# Patient Record
Sex: FEMALE | Race: BLACK OR AFRICAN AMERICAN | NOT HISPANIC OR LATINO | URBAN - METROPOLITAN AREA
[De-identification: names, ages, dates, MRNs, and addresses within clinical notes are randomized per-mention and may not be internally consistent; named-entity substitution may affect disease eponyms.]

---

## 2020-08-08 ENCOUNTER — EMERGENCY (EMERGENCY)
Facility: HOSPITAL | Age: 50
LOS: 1 days | Discharge: HOME | End: 2020-08-08
Attending: EMERGENCY MEDICINE
Payer: MEDICARE

## 2020-08-08 VITALS
DIASTOLIC BLOOD PRESSURE: 88 MMHG | SYSTOLIC BLOOD PRESSURE: 122 MMHG | TEMPERATURE: 98 F | HEART RATE: 88 BPM | RESPIRATION RATE: 18 BRPM | OXYGEN SATURATION: 99 %

## 2020-08-08 VITALS
SYSTOLIC BLOOD PRESSURE: 127 MMHG | OXYGEN SATURATION: 100 % | HEART RATE: 82 BPM | WEIGHT: 227.08 LBS | RESPIRATION RATE: 18 BRPM | DIASTOLIC BLOOD PRESSURE: 83 MMHG | HEIGHT: 63 IN | TEMPERATURE: 98 F

## 2020-08-08 PROCEDURE — 73564 X-RAY EXAM KNEE 4 OR MORE: CPT

## 2020-08-08 PROCEDURE — 99283 EMERGENCY DEPT VISIT LOW MDM: CPT

## 2020-08-08 PROCEDURE — 93010 ELECTROCARDIOGRAM REPORT: CPT | Mod: GC

## 2020-08-08 PROCEDURE — 73564 X-RAY EXAM KNEE 4 OR MORE: CPT | Mod: 26,RT

## 2020-08-08 PROCEDURE — 99284 EMERGENCY DEPT VISIT MOD MDM: CPT | Mod: GC

## 2020-08-08 PROCEDURE — 93005 ELECTROCARDIOGRAM TRACING: CPT

## 2020-08-08 NOTE — ED PROVIDER NOTE - NS ED ROS FT
GENERAL: No fever or chills  EYES: No change in vision  HEENT: No trouble swallowing or speaking  CARDIAC: No chest pain  PULMONARY: No cough or SOB  GI: No abdominal pain, no nausea or no vomiting, no diarrhea or constipation  : No changes in urination  SKIN: No rashes  NEURO: No headache, no numbness  MSK: + R. knee joint pain  Otherwise as HPI or negative.

## 2020-08-08 NOTE — ED PROVIDER NOTE - CLINICAL SUMMARY MEDICAL DECISION MAKING FREE TEXT BOX
50 Y f no pertinent PMH presenting with R. Knee pain. Rule out DVT (non-tender calf, no stasis, cancer), rule out atraumatic fracture. Likely arthritis vs. osteoarthritis. Likely DC.    Lisa Sommer MD - Attending Physician: Pt here with R knee pain, atraumatic, mild swelling. No prior VTE history, no travel, no risk factors. Exam c/w likely arthritis vs Bakers cyst. Much less likely dvt. US and Xr for eval

## 2020-08-08 NOTE — ED PROVIDER NOTE - NSFOLLOWUPINSTRUCTIONS_ED_ALL_ED_FT
50 Y f no pertinent PMH presenting with R. Knee pain. Rule out DVT (non-tender calf, no stasis, cancer), rule out atraumatic fracture. Likely arthritis vs. osteoarthritis. Likely DC. You presented to the ER with knee pain. This is likely due to long-term wear of the knee joint; however, it is essential you complete an evaluation with outpatient ultrasonography to make sure there isn't a clot in your leg. Please followup within 2 days with your primary care doctor and ultrasonography of the R. Leg. At any point in time if you experience worsening swelling, redness over the knee, shortness of breath, chest pain, vomiting, or lose consciousness please return to the ER. You presented to the ER with knee pain. This is likely due to long-term wear of the knee joint; however, it is essential you complete an evaluation with outpatient ultrasonography to make sure there isn't a clot in your leg. Please followup within 24-48 hours with your primary care doctor for ultrasonography of the R. Leg. At any point in time if you experience worsening swelling, redness over the knee, shortness of breath, chest pain, vomiting, or lose consciousness please return to the ER.

## 2020-08-08 NOTE — ED PROVIDER NOTE - PROGRESS NOTE DETAILS
Sanjeev Walsh MD:  Ultrasound contacted, attempt to expedite care Sanjeev Walsh MD:  Shared decision making used with patient, states she wants to leave for outpatient ultrasound. Patient currently stable, vocalized discharge instructions, states will followup with PMD in 24-48 hours for outpatient ultrasound referral.

## 2020-08-08 NOTE — ED PROVIDER NOTE - PHYSICAL EXAMINATION
PHYSICAL EXAM:  GENERAL: NAD, well-developed  HEAD:  Atraumatic, Normocephalic  EYES: EOMI, PERRLA, conjunctiva and sclera clear  NECK: Supple, No JVD  CHEST/LUNG: Clear to auscultation bilaterally; No wheeze  HEART: Regular rate and rhythm; No murmurs, rubs, or gallops  ABDOMEN: Soft, Nontender, Nondistended; Bowel sounds present  EXTREMITIES:  2+ Peripheral Pulses, No clubbing, cyanosis, or edema, minor swelling around R. knee, tenderness to palpation across medial aspect of knee.  PSYCH: AAOx3  NEUROLOGY: non-focal  SKIN: No rashes or lesions

## 2020-08-08 NOTE — ED PROVIDER NOTE - OBJECTIVE STATEMENT
50 Y F H/O fibromyalgia, hypotheroid presents with new onset R knee pain. states 2 days ago while sitting began feeling pain in r. popiteal region. Pain worsens with ambulation, associated with swelling. Denies any SOB since event, fever, chills, vomiting, associated trauma, FHX PE, HX clotting disorders, immobilization, active cancer. 50 Y F H/O fibromyalgia, hypothyroidism presents with new onset R knee pain. states 2 days ago while sitting began feeling pain in r. popiteal region. Pain worsens with ambulation, associated with swelling. Denies any SOB since event, fever, chills, vomiting, associated trauma, FHX PE, HX clotting disorders, immobilization, active cancer. No trauma. No fevers.

## 2020-08-08 NOTE — ED PROVIDER NOTE - ATTENDING CONTRIBUTION TO CARE
Lisa Sommer MD - Attending Physician: I have personally seen and examined this patient with the resident/fellow.  I have fully participated in the care of this patient. I have reviewed all pertinent clinical information, including history, physical exam, plan and the Resident/Fellow’s note and agree except as noted. See MDM

## 2020-08-08 NOTE — ED ADULT NURSE NOTE - OBJECTIVE STATEMENT
pt here for right knee pain and swelling     she has hypothyroidism as a history.  pulkses and refill of right leg are without deficit
